# Patient Record
Sex: FEMALE
[De-identification: names, ages, dates, MRNs, and addresses within clinical notes are randomized per-mention and may not be internally consistent; named-entity substitution may affect disease eponyms.]

---

## 2017-12-26 ENCOUNTER — HOSPITAL ENCOUNTER (EMERGENCY)
Dept: HOSPITAL 31 - C.ER | Age: 62
LOS: 1 days | Discharge: HOME | End: 2017-12-27
Payer: MEDICAID

## 2017-12-26 VITALS — TEMPERATURE: 97.8 F

## 2017-12-26 VITALS — BODY MASS INDEX: 20.1 KG/M2

## 2017-12-26 VITALS — OXYGEN SATURATION: 100 %

## 2017-12-26 DIAGNOSIS — I10: ICD-10-CM

## 2017-12-26 DIAGNOSIS — S42.002A: Primary | ICD-10-CM
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
## 2017-12-26 DIAGNOSIS — E78.00: ICD-10-CM

## 2017-12-26 PROCEDURE — 96372 THER/PROPH/DIAG INJ SC/IM: CPT

## 2017-12-26 PROCEDURE — 73030 X-RAY EXAM OF SHOULDER: CPT

## 2017-12-26 PROCEDURE — 99284 EMERGENCY DEPT VISIT MOD MDM: CPT

## 2017-12-26 PROCEDURE — 73000 X-RAY EXAM OF COLLAR BONE: CPT

## 2017-12-27 VITALS — DIASTOLIC BLOOD PRESSURE: 80 MMHG | HEART RATE: 68 BPM | RESPIRATION RATE: 16 BRPM | SYSTOLIC BLOOD PRESSURE: 150 MMHG

## 2018-08-08 ENCOUNTER — HOSPITAL ENCOUNTER (EMERGENCY)
Dept: HOSPITAL 14 - H.ER | Age: 63
Discharge: HOME | End: 2018-08-08
Payer: MEDICAID

## 2018-08-08 VITALS — SYSTOLIC BLOOD PRESSURE: 140 MMHG | HEART RATE: 62 BPM | DIASTOLIC BLOOD PRESSURE: 70 MMHG | RESPIRATION RATE: 18 BRPM

## 2018-08-08 VITALS — TEMPERATURE: 98 F | OXYGEN SATURATION: 99 %

## 2018-08-08 VITALS — BODY MASS INDEX: 20.1 KG/M2

## 2018-08-08 DIAGNOSIS — N39.0: Primary | ICD-10-CM

## 2018-08-08 DIAGNOSIS — Z87.891: ICD-10-CM

## 2018-08-08 DIAGNOSIS — R10.9: ICD-10-CM

## 2018-08-08 DIAGNOSIS — Z86.73: ICD-10-CM

## 2018-08-08 DIAGNOSIS — I10: ICD-10-CM

## 2018-08-08 DIAGNOSIS — E78.00: ICD-10-CM

## 2018-08-08 DIAGNOSIS — R19.7: ICD-10-CM

## 2018-08-08 DIAGNOSIS — R11.2: ICD-10-CM

## 2018-08-08 LAB
ALBUMIN SERPL-MCNC: 4.3 G/DL (ref 3.5–5)
ALBUMIN/GLOB SERPL: 1.6 {RATIO} (ref 1–2.1)
ALT SERPL-CCNC: 41 U/L (ref 9–52)
AST SERPL-CCNC: 33 U/L (ref 14–36)
BACTERIA #/AREA URNS HPF: (no result) /[HPF]
BASOPHILS # BLD AUTO: 0 K/UL (ref 0–0.2)
BASOPHILS NFR BLD: 0.7 % (ref 0–2)
BILIRUB UR-MCNC: NEGATIVE MG/DL
BUN SERPL-MCNC: 16 MG/DL (ref 7–17)
CALCIUM SERPL-MCNC: 10.3 MG/DL (ref 8.4–10.2)
COLOR UR: (no result)
EOSINOPHIL # BLD AUTO: 0 K/UL (ref 0–0.7)
EOSINOPHIL NFR BLD: 0.7 % (ref 0–4)
ERYTHROCYTE [DISTWIDTH] IN BLOOD BY AUTOMATED COUNT: 14.7 % (ref 11.5–14.5)
GFR NON-AFRICAN AMERICAN: > 60
GLUCOSE UR STRIP-MCNC: (no result) MG/DL
HGB BLD-MCNC: 14.6 G/DL (ref 12–16)
LEUKOCYTE ESTERASE UR-ACNC: (no result) LEU/UL
LIPASE SERPL-CCNC: 71 U/L (ref 23–300)
LYMPHOCYTES # BLD AUTO: 1.6 K/UL (ref 1–4.3)
LYMPHOCYTES NFR BLD AUTO: 23.1 % (ref 20–40)
MCH RBC QN AUTO: 30.1 PG (ref 27–31)
MCHC RBC AUTO-ENTMCNC: 34 G/DL (ref 33–37)
MCV RBC AUTO: 88.5 FL (ref 81–99)
MONOCYTES # BLD: 0.8 K/UL (ref 0–0.8)
MONOCYTES NFR BLD: 10.8 % (ref 0–10)
NEUTROPHILS # BLD: 4.5 K/UL (ref 1.8–7)
NEUTROPHILS NFR BLD AUTO: 64.7 % (ref 50–75)
NRBC BLD AUTO-RTO: 0.2 % (ref 0–0)
PH UR STRIP: 6 [PH] (ref 5–8)
PLATELET # BLD: 312 K/UL (ref 130–400)
PMV BLD AUTO: 7.4 FL (ref 7.2–11.7)
PROT UR STRIP-MCNC: NEGATIVE MG/DL
RBC # BLD AUTO: 4.85 MIL/UL (ref 3.8–5.2)
RBC # UR STRIP: (no result) /UL
SP GR UR STRIP: 1.01 (ref 1–1.03)
SQUAMOUS EPITHIAL: 1 /HPF (ref 0–5)
URINE CLARITY: CLEAR
UROBILINOGEN UR-MCNC: (no result) MG/DL (ref 0.2–1)
WBC # BLD AUTO: 7 K/UL (ref 4.8–10.8)

## 2018-08-08 PROCEDURE — 96375 TX/PRO/DX INJ NEW DRUG ADDON: CPT

## 2018-08-08 PROCEDURE — 82948 REAGENT STRIP/BLOOD GLUCOSE: CPT

## 2018-08-08 PROCEDURE — 80053 COMPREHEN METABOLIC PANEL: CPT

## 2018-08-08 PROCEDURE — 99284 EMERGENCY DEPT VISIT MOD MDM: CPT

## 2018-08-08 PROCEDURE — 83690 ASSAY OF LIPASE: CPT

## 2018-08-08 PROCEDURE — 96374 THER/PROPH/DIAG INJ IV PUSH: CPT

## 2018-08-08 PROCEDURE — 85025 COMPLETE CBC W/AUTO DIFF WBC: CPT

## 2018-08-08 PROCEDURE — 81003 URINALYSIS AUTO W/O SCOPE: CPT

## 2018-08-08 PROCEDURE — 87086 URINE CULTURE/COLONY COUNT: CPT

## 2018-08-08 NOTE — ED PDOC
HPI: Abdomen


Time Seen by Provider: 08/08/18 13:06


Chief Complaint (Nursing): Abdominal Pain


Chief Complaint (Provider): Abdominal Pain


History Per: Patient


History/Exam Limitations: no limitations


Onset/Duration Of Symptoms: Days (x2)


Outside of [place] travel?: Yes


Other Location:: [place]


Current Symptoms Are (Timing): Still Present


Location Of Pain/Discomfort: Diffuse (upper > lower)


Quality Of Discomfort: "Pain"


Associated Symptoms: Nausea, Vomiting, Diarrhea.  denies: Fever, Chills, Chest 

Pain, Urinary Symptoms


Additional Complaint(s): 


61 y/o female with a PMHx of HTN, CVA, depression and anxiety presents to the 

ED for evaluation of diffuse abdominal pain (upper > lower), onset two days 

ago. Patient states pain is sharp and intermittent. Patient reports pain is 

associated with nausea, 5 episodes of non-bloody diarrhea, and 3 episodes of non

-bilious, non-bloody vomiting. Patient reports of recent travel to the 

[place] two months ago but has felt fine since return until 

recently. Otherwise: (-) fever, (-) chills, (-) urinary symptoms, (-) cough, (-

) shortness of breath, (-) chest pain, (-) sick contacts.  Patient took no 

medications prior to arrival in ED.





PMD: Madhavi Simon





Past Medical History


Reviewed: Historical Data, Nursing Documentation, Vital Signs


Vital Signs: 


 Last Vital Signs











Temp  98.0 F   08/08/18 12:33


 


Pulse  62   08/08/18 17:53


 


Resp  18   08/08/18 17:53


 


BP  140/70   08/08/18 17:53


 


Pulse Ox  99   08/08/18 17:53














- Medical History


PMH: Anxiety, Asthma, CVA, Depression, HTN, Hypercholesterolemia





- Surgical History


Surgical History: No Surg Hx





- Family History


Family History: States: Unknown Family Hx





- Social History


Current smoker - smoking cessation education provided: No


Alcohol: None


Drugs: Denies





- Home Medications


Home Medications: 


 Ambulatory Orders











 Medication  Instructions  Recorded


 


Alprazolam [Xanax] 2 mg PO BID 12/26/17


 


Aspirin [Ecotrin] 81 mg PO DAILY 12/26/17


 


Hydrochlorothiazide [Microzide]  12/26/17


 


Metoprolol Succinate 50 mg PO BID 12/26/17


 


TEGretol mg PO DAILY 12/26/17


 


amLODIPine [Norvasc] 5 mg PO DAILY 12/26/17


 


Acetaminophen with Codeine 1 each PO Q6 #8 tablet 12/27/17





[Tylenol with Codeine #3 Tablet]  


 


Ibuprofen [Motrin] 600 mg PO TID #30 tab 12/27/17


 


Dicyclomine [Bentyl] 20 mg PO TID PRN #12 tab 08/08/18


 


Nitrofurantoin Macrocrystals 100 mg PO BID #14 cap 08/08/18





[Macrobid]  


 


Ondansetron ODT [Zofran ODT] 8 mg PO Q8 PRN #12 odt 08/08/18














- Allergies


Allergies/Adverse Reactions: 


 Allergies











Allergy/AdvReac Type Severity Reaction Status Date / Time


 


penicillin G Allergy Mild RASH Verified 11/29/16 11:18














Review of Systems


ROS Statement: Except As Marked, All Systems Reviewed And Found Negative


Constitutional: Negative for: Fever, Chills


Cardiovascular: Negative for: Chest Pain


Respiratory: Negative for: Cough, Shortness of Breath


Gastrointestinal: Positive for: Nausea, Vomiting, Abdominal Pain, Diarrhea


Genitourinary Female: Negative for: Dysuria, Frequency, Hematuria





Physical Exam





- Reviewed


Nursing Documentation Reviewed: Yes


Vital Signs Reviewed: Yes





- Physical Exam


Comments: 


GENERAL APPEARANCE: Patient is awake, alert, oriented x 3, in no distress. 

Resting comfortably. 


SKIN:  Warm, dry; (-) cyanosis.


EYES: EOMI and painless. (-) conjunctival pallor, (-) scleral icterus.


ENMT:  Mucous membranes moist. Airway patent, (-) stridor. 


NECK: Supple, FROM


CHEST AND RESPIRATORY:  (-) rales, (-) rhonchi, (-) wheezes; breath sounds 

equal bilaterally. Respirations even and nonlabored, speaking in full sentences.


HEART AND CARDIOVASCULAR:  (-) irregularity; (-) murmur


ABDOMEN AND GI:  (-) distention.  Bowel sounds active x4; Soft; (+) tenderness 

to the right upper and left upper quadrant (-) guarding, (-) rebound, (-) 

palpable masses (-) Early's, (-) CVA tenderness.


EXTREMITIES:  (-) deformity, (-) edema, (+) distal pulses.


NEURO AND PSYCH:  Mental status as above; (-) focal findings (-) facial 

asymmetry. Speech clear, gait steady. 





- Laboratory Results


Result Diagrams: 


 08/08/18 13:44





 08/08/18 13:44


Urine dip results: Positive for: Leukocyte Esterase (trace), Blood (trace-intact

), Protein (30).  Negative for: Nitrate, Ketones, Glucose, Bilirubin





- ECG


O2 Sat by Pulse Oximetry: 99 (RA)


Pulse Ox Interpretation: Normal





Medical Decision Making


Medical Decision Making: 


Time: 1322


Impression: Abdominal pain, nausea, vomiting and diarrhea


Plan:


-- Sodium Chloride  mls/hr


-- Pepcid 40 mg IVP


-- Toradol 30 mg IVP


-- Zofran Inj 4 mg IVP


-- Cardiac Monitor


-- IV Insertion


-- Glucose, Blood, POC


-- CMP


-- Lipase


-- ED Urine Dipstick


-- CBC with differentials


-- Bentyl 20 mg PO








Time: 1430


CBC and CMP grossly unremarkable. No elevation of LFTs.


Lipase WNL. 





Time: 1630


Udip reviewed. U/A and U/C ordered. 


Patient tolerating PO intake in ED. 





1725


U/A reviewed. Macrobid 100mg PO ordered. 


On re-evaluation, patient reports resolution of symptoms. On exam, patient 

remains AAOx3, in no acute distress. Lungs clear to auscultation, cardiac RRR, 

abdomen soft, non-tender, repeat neuro exam shows no focal findings. VSS, 

stable for discharge. [place] diet and fluids encouraged. 


Lab/Diagnostic results d/w the patient in great detail. Diagnosis of abdominal 

pain, nausea, vomiting, diarrhea; UTI d/w the patient. 


Based on history, exam and diagnostic results, plan will be for outpatient 

follow up. 


Patient instructed to follow-up with pmd / referral provided / the clinic  in 1-

2 days without fail. Advised to take medication as prescribed. Return to the 

emergency room at any time for any new or worsening symptoms. Patient states 

she fully agrees with and understands discharge instructions. States that she 

agrees with the plan and disposition. Verbalized and repeated discharge 

instructions and plan. I have given the patient opportunity to ask any 

additional questions.


________________________________________________________________________________

__


Scribe Attestation:


Documented by Faiza Barnard acting as a scribe for Toma Dias PA-C.





Provider Scribe Attestation:


All medical record entries made by the Scribe were at my direction and 

personally dictated by me. I have reviewed the chart and agree that the record 

accurately reflects my personal performance of the history, physical exam, 

medical decision making, and the department course for this patient. I have 

also personally directed, reviewed, and agree with the discharge instructions 

and disposition.





Disposition





- Clinical Impression


Clinical Impression: 


 Abdominal pain, Nausea and vomiting, Diarrhea, UTI (urinary tract infection)








- Patient ED Disposition


Is Patient to be Admitted: No


Counseled Patient/Family Regarding: Studies Performed, Diagnosis, Need For 

Followup, Rx Given





- Disposition


Referrals: 


Edson Simon MD [Medical Doctor] - 


Disposition: Routine/Home


Disposition Time: 17:33


Condition: STABLE


Additional Instructions: 


The emergency medical care you received today was directed at your acute 

symptoms. If you were prescribed any medication, please fill it and take as 

directed. It may take several days for your symptoms to resolve. Return to the 

Emergency Department if your symptoms worsen, do not improve, or if you have 

any other problems.


   


Please contact your doctor in 2 days for re-evaluation and follow up / or call 

one of the physicians/clinics you have been referred to that are listed on the 

Patient Visit Information form that is included in your discharge packet. Bring 

any paperwork you were given at discharge with you along with any medications 

you are taking to your follow up visit. Our treatment cannot replace ongoing 

medical care by a primary care provider (PCP) outside of the emergency 

department.





Thank you for allowing the [facility] hospital team to be part of your care today.


If you had a urine / blood culture test done : We will call you regarding any 

positive results***


If you had a STD test done : We will call you regarding any positive results***


If you had an X-Ray : A Radiologist will review the ED reading if any change in 

treatment is needed we will contact you.***





Prescriptions: 


Dicyclomine [Bentyl] 20 mg PO TID PRN #12 tab


 PRN Reason: Diarrhea


Nitrofurantoin Macrocrystals [Macrobid] 100 mg PO BID #14 cap


Ondansetron ODT [Zofran ODT] 8 mg PO Q8 PRN #12 odt


 PRN Reason: Nausea/Vomiting


Instructions:  Urinary Tract Infections in Adults, Diarrhea in Adolescents and 

Adults, [place] Diet, Acute Abdomen (Belly Pain), Nausea and Vomiting, Adult


Forms:  [facility] (English)


Print Language: ENGLISH





- POA


Present On Arrival: None





Results





- Lab Results


Lab Results: 














  08/08/18 08/08/18 08/08/18





  16:39 13:59 13:44


 


WBC   


 


RBC   


 


Hgb   


 


Hct   


 


MCV   


 


MCH   


 


MCHC   


 


RDW   


 


Plt Count   


 


MPV   


 


Neut % (Auto)   


 


Lymph % (Auto)   


 


Mono % (Auto)   


 


Eos % (Auto)   


 


Baso % (Auto)   


 


Neut # (Auto)   


 


Lymph # (Auto)   


 


Mono # (Auto)   


 


Eos # (Auto)   


 


Baso # (Auto)   


 


Sodium    141


 


Potassium    4.3


 


Chloride    101


 


Carbon Dioxide    30


 


Anion Gap    14


 


BUN    16


 


Creatinine    0.5 L


 


Est GFR ( Amer)    > 60


 


Est GFR (Non-Af Amer)    > 60


 


POC Glucose (mg/dL)   93 


 


Random Glucose    99


 


Calcium    10.3 H


 


Total Bilirubin    0.7


 


AST    33


 


ALT    41


 


Alkaline Phosphatase    87


 


Total Protein    7.0


 


Albumin    4.3


 


Globulin    2.7


 


Albumin/Globulin Ratio    1.6


 


Lipase    71


 


Urine Color  Straw  


 


Urine Clarity  Clear  


 


Urine pH  6.0  


 


Ur Specific Gravity  1.009  


 


Urine Protein  Negative  


 


Urine Glucose (UA)  Neg  


 


Urine Ketones  Negative  


 


Urine Blood  Small  


 


Urine Nitrate  Negative  


 


Urine Bilirubin  Negative  


 


Urine Urobilinogen  0.2-1.0  


 


Ur Leukocyte Esterase  Trace  


 


Urine RBC (Auto)  2  


 


Urine Microscopic WBC  1  


 


Ur Squamous Epith Cells  1  


 


Urine Bacteria  Rare  














  08/08/18





  13:44


 


WBC  7.0


 


RBC  4.85


 


Hgb  14.6


 


Hct  43.0


 


MCV  88.5


 


MCH  30.1


 


MCHC  34.0


 


RDW  14.7 H


 


Plt Count  312


 


MPV  7.4


 


Neut % (Auto)  64.7


 


Lymph % (Auto)  23.1


 


Mono % (Auto)  10.8 H


 


Eos % (Auto)  0.7


 


Baso % (Auto)  0.7


 


Neut # (Auto)  4.5


 


Lymph # (Auto)  1.6


 


Mono # (Auto)  0.8


 


Eos # (Auto)  0.0


 


Baso # (Auto)  0.0


 


Sodium 


 


Potassium 


 


Chloride 


 


Carbon Dioxide 


 


Anion Gap 


 


BUN 


 


Creatinine 


 


Est GFR ( Amer) 


 


Est GFR (Non-Af Amer) 


 


POC Glucose (mg/dL) 


 


Random Glucose 


 


Calcium 


 


Total Bilirubin 


 


AST 


 


ALT 


 


Alkaline Phosphatase 


 


Total Protein 


 


Albumin 


 


Globulin 


 


Albumin/Globulin Ratio 


 


Lipase 


 


Urine Color 


 


Urine Clarity 


 


Urine pH 


 


Ur Specific Gravity 


 


Urine Protein 


 


Urine Glucose (UA) 


 


Urine Ketones 


 


Urine Blood 


 


Urine Nitrate 


 


Urine Bilirubin 


 


Urine Urobilinogen 


 


Ur Leukocyte Esterase 


 


Urine RBC (Auto) 


 


Urine Microscopic WBC 


 


Ur Squamous Epith Cells 


 


Urine Bacteria

## 2018-11-01 ENCOUNTER — HOSPITAL ENCOUNTER (EMERGENCY)
Dept: HOSPITAL 14 - H.ER | Age: 63
Discharge: HOME | End: 2018-11-01
Payer: MEDICAID

## 2018-11-01 VITALS
RESPIRATION RATE: 16 BRPM | HEART RATE: 67 BPM | DIASTOLIC BLOOD PRESSURE: 79 MMHG | SYSTOLIC BLOOD PRESSURE: 156 MMHG | OXYGEN SATURATION: 100 % | TEMPERATURE: 97.7 F

## 2018-11-01 VITALS — BODY MASS INDEX: 20.1 KG/M2

## 2018-11-01 DIAGNOSIS — M62.830: Primary | ICD-10-CM

## 2018-11-01 PROCEDURE — 96372 THER/PROPH/DIAG INJ SC/IM: CPT

## 2018-11-01 PROCEDURE — 99283 EMERGENCY DEPT VISIT LOW MDM: CPT

## 2018-11-01 NOTE — ED PDOC
HPI: Back


Time Seen by Provider: 18 07:06


Chief Complaint (Nursing): Hip Pain


Chief Complaint (Provider): Hip Pain


History Per: Patient


History/Exam Limitations: no limitations


Onset/Duration Of Symptoms: Days (x 2)


Quality Of Discomfort: "Pain"


Additional Complaint(s): 


62 year old female with a history of CVA, back spasms and HTN presents to the ED

with left sided back spasms and left leg pain. Pain is located in left upper and

lower back as well as the entire left leg above the knee. Patient reports she 

has a history of similar symptoms and usually takes oxycodone. She took her 

medication last night without relief. Otherwise, denies numbness, tingling, 

nausea, vomiting, incontinence, constipation, chest pain, abdominal pain and 

other medical complaints. 








PMD: Dr. Simon 





Past Medical History


Reviewed: Historical Data, Nursing Documentation, Vital Signs


Vital Signs: 


                                Last Vital Signs











Temp  97.7 F   18 06:18


 


Pulse  67   18 06:18


 


Resp  16   18 06:18


 


BP  156/79 H  18 06:18


 


Pulse Ox  100   18 06:18














- Medical History


PMH: Anxiety, Asthma, CVA (left foot drop as residual deficit), Depression, HTN,

Hypercholesterolemia, Migraine, Seizures





- Surgical History


Surgical History: No Surg Hx





- Family History


Family History: States: Unknown Family Hx





- Immunization History


Hx Tetanus Toxoid Vaccination: Yes


Hx Influenza Vaccination: Yes


Hx Pneumococcal Vaccination: Yes





- Home Medications


Home Medications: 


                                Ambulatory Orders











 Medication  Instructions  Recorded


 


Alprazolam [Xanax] 2 mg PO BID 17


 


Aspirin [Ecotrin] 81 mg PO DAILY 17


 


Hydrochlorothiazide [Microzide]  17


 


Metoprolol Succinate 50 mg PO BID 17


 


TEGretol mg PO DAILY 17


 


amLODIPine [Norvasc] 5 mg PO DAILY 17


 


Acetaminophen with Codeine 1 each PO Q6 #8 tablet 17





[Tylenol with Codeine #3 Tablet]  


 


Ibuprofen [Motrin] 600 mg PO TID #30 tab 17


 


Dicyclomine [Bentyl] 20 mg PO TID PRN #12 tab 18


 


Nitrofurantoin Macrocrystals 100 mg PO BID #14 cap 18





[Macrobid]  


 


Ondansetron ODT [Zofran ODT] 8 mg PO Q8 PRN #12 odt 18


 


Diazepam [Valium] 2 mg PO BID PRN #6 tab 18


 


Ibuprofen [Motrin] 600 mg PO TID 7 Days  tab 18














- Allergies


Allergies/Adverse Reactions: 


                                    Allergies











Allergy/AdvReac Type Severity Reaction Status Date / Time


 


penicillin G Allergy Mild RASH Verified 18 06:18














Review of Systems


ROS Statement: Except As Marked, All Systems Reviewed And Found Negative


Constitutional: Negative for: Weakness


Cardiovascular: Negative for: Chest Pain


Respiratory: Negative for: Shortness of Breath


Gastrointestinal: Negative for: Nausea, Vomiting, Abdominal Pain, Constipation, 

Rectal Pain


Genitourinary Female: Negative for: Incontinence


Musculoskeletal: Positive for: Back Pain (left upper and lower ), Leg Pain (left

 upper )


Neurological: Negative for: Weakness, Numbness





Physical Exam





- Reviewed


Nursing Documentation Reviewed: Yes


Vital Signs Reviewed: Yes





- Physical Exam


Appears: Positive for: Non-toxic, No Acute Distress


Head Exam: Positive for: ATRAUMATIC, NORMAL INSPECTION, NORMOCEPHALIC


Skin: Positive for: Normal Color, Warm, Dry


Eye Exam: Positive for: EOMI, Normal appearance, PERRL


Neck: Positive for: Normal, Painless ROM, Supple


Cardiovascular/Chest: Positive for: Regular Rate, Rhythm.  Negative for: Murmur


Respiratory: Positive for: Normal Breath Sounds.  Negative for: Respiratory 

Distress


Pulses-Dorsalis Pedis (L): 2+


Gastrointestinal/Abdominal: Positive for: Normal Exam, Soft.  Negative for: 

Tenderness


Back: Positive for: Other (mild tenderness left upper and lower back )


Extremity: Positive for: Normal ROM (full ROM of left leg with pain), Tenderness

 (mild tenderness in posterior and anterior upper left leg ).  Negative for: 

Calf Tenderness (or knee tenderness), Swelling (leg swelling)


Neurologic/Psych: Positive for: Alert, Oriented.  Negative for: Motor/Sensory 

Deficits





- ECG


O2 Sat by Pulse Oximetry: 100 (RA)


Pulse Ox Interpretation: Normal





- Progress


ED Course And Treament: 





945:  Stable.  AAOx3.  Pain improved.  Fu with pcp.  Ambulating. 





Medical Decision Making


Medical Decision Makin:39 


Impression: left back and leg pain


Initial Plan: 


--Toradol 15 mg IM  


--Valium 5 mg PO 











 

--------------------------------------------------------------------------------


-----------------


Scribe Attestation:


Documented by Lupis Garcia acting as a scribe for Vicente Christiansen MD





Provider Scribe Attestation:


All medical record entries made by the Scribe were at my direction and 

personally dictated by me. I have reviewed the chart and agree that the record 

accurately reflects my personal performance of the history, physical exam, 

medical decision making, and the department course for this patient. I have also

 personally directed, reviewed, and agree with the discharge instructions and 

disposition.





Disposition





- Clinical Impression


Clinical Impression: 


 Muscle spasm








- Patient ED Disposition


Is Patient to be Admitted: No


Counseled Patient/Family Regarding: Diagnosis, Need For Followup, Rx Given





- Disposition


Referrals: 


[facility] [Outside] - 18


Disposition: Routine/Home


Disposition Time: 09:46


Condition: STABLE


Additional Instructions: 


Return if not better in 3 days.


Prescriptions: 


Diazepam [Valium] 2 mg PO BID PRN #6 tab


 PRN Reason: Muscle Spasm


Ibuprofen [Motrin] 600 mg PO TID 7 Days  tab


Instructions:  Muscle Spasms (DC)


Forms:  [facility] (English)

## 2019-02-20 ENCOUNTER — HOSPITAL ENCOUNTER (EMERGENCY)
Dept: HOSPITAL 31 - C.ER | Age: 64
Discharge: HOME | End: 2019-02-20
Payer: MEDICAID

## 2019-02-20 VITALS — BODY MASS INDEX: 21.6 KG/M2

## 2019-02-20 VITALS — TEMPERATURE: 97.6 F

## 2019-02-20 VITALS
RESPIRATION RATE: 14 BRPM | OXYGEN SATURATION: 99 % | DIASTOLIC BLOOD PRESSURE: 69 MMHG | HEART RATE: 56 BPM | SYSTOLIC BLOOD PRESSURE: 146 MMHG

## 2019-02-20 DIAGNOSIS — R07.89: Primary | ICD-10-CM

## 2019-02-20 LAB
ALBUMIN SERPL-MCNC: 5.1 G/DL (ref 3.5–5)
ALBUMIN/GLOB SERPL: 1.9 {RATIO} (ref 1–2.1)
ALT SERPL-CCNC: 24 U/L (ref 9–52)
AST SERPL-CCNC: 26 U/L (ref 14–36)
BASOPHILS # BLD AUTO: 0 K/UL (ref 0–0.2)
BASOPHILS NFR BLD: 0.1 % (ref 0–2)
BUN SERPL-MCNC: 30 MG/DL (ref 7–17)
CALCIUM SERPL-MCNC: 10.5 MG/DL (ref 8.6–10.4)
EOSINOPHIL # BLD AUTO: 0.1 K/UL (ref 0–0.7)
EOSINOPHIL NFR BLD: 1.1 % (ref 0–4)
ERYTHROCYTE [DISTWIDTH] IN BLOOD BY AUTOMATED COUNT: 14.1 % (ref 11.5–14.5)
GFR NON-AFRICAN AMERICAN: 46
HGB BLD-MCNC: 13.7 G/DL (ref 11–16)
LYMPHOCYTES # BLD AUTO: 1.7 K/UL (ref 1–4.3)
LYMPHOCYTES NFR BLD AUTO: 18.8 % (ref 20–40)
MCH RBC QN AUTO: 31.3 PG (ref 27–31)
MCHC RBC AUTO-ENTMCNC: 33.9 G/DL (ref 33–37)
MCV RBC AUTO: 92.3 FL (ref 81–99)
MONOCYTES # BLD: 1.3 K/UL (ref 0–0.8)
MONOCYTES NFR BLD: 13.9 % (ref 0–10)
NEUTROPHILS # BLD: 6 K/UL (ref 1.8–7)
NEUTROPHILS NFR BLD AUTO: 66.1 % (ref 50–75)
NRBC BLD AUTO-RTO: 0.2 % (ref 0–2)
PLATELET # BLD: 370 K/UL (ref 130–400)
PMV BLD AUTO: 7.4 FL (ref 7.2–11.7)
RBC # BLD AUTO: 4.39 MIL/UL (ref 3.8–5.2)
WBC # BLD AUTO: 9.1 K/UL (ref 4.8–10.8)

## 2019-02-20 PROCEDURE — 96374 THER/PROPH/DIAG INJ IV PUSH: CPT

## 2019-02-20 PROCEDURE — 85025 COMPLETE CBC W/AUTO DIFF WBC: CPT

## 2019-02-20 PROCEDURE — 80053 COMPREHEN METABOLIC PANEL: CPT

## 2019-02-20 PROCEDURE — 93005 ELECTROCARDIOGRAM TRACING: CPT

## 2019-02-20 PROCEDURE — 71045 X-RAY EXAM CHEST 1 VIEW: CPT

## 2019-02-20 PROCEDURE — 99284 EMERGENCY DEPT VISIT MOD MDM: CPT

## 2019-02-20 PROCEDURE — 84484 ASSAY OF TROPONIN QUANT: CPT
